# Patient Record
Sex: MALE | Race: WHITE | NOT HISPANIC OR LATINO | Employment: UNEMPLOYED | URBAN - METROPOLITAN AREA
[De-identification: names, ages, dates, MRNs, and addresses within clinical notes are randomized per-mention and may not be internally consistent; named-entity substitution may affect disease eponyms.]

---

## 2019-09-08 ENCOUNTER — HOSPITAL ENCOUNTER (EMERGENCY)
Facility: HOSPITAL | Age: 34
Discharge: HOME | End: 2019-09-08
Attending: EMERGENCY MEDICINE | Admitting: EMERGENCY MEDICINE
Payer: COMMERCIAL

## 2019-09-08 VITALS
HEIGHT: 73 IN | WEIGHT: 222 LBS | SYSTOLIC BLOOD PRESSURE: 121 MMHG | BODY MASS INDEX: 29.42 KG/M2 | OXYGEN SATURATION: 96 % | HEART RATE: 97 BPM | RESPIRATION RATE: 18 BRPM | DIASTOLIC BLOOD PRESSURE: 72 MMHG | TEMPERATURE: 98 F

## 2019-09-08 DIAGNOSIS — S61.411A LACERATION OF RIGHT HAND WITHOUT FOREIGN BODY, INITIAL ENCOUNTER: Primary | ICD-10-CM

## 2019-09-08 PROCEDURE — 13132 CMPLX RPR F/C/C/M/N/AX/G/H/F: CPT | Performed by: PHYSICIAN ASSISTANT

## 2019-09-08 PROCEDURE — 99282 EMERGENCY DEPT VISIT SF MDM: CPT

## 2019-09-08 PROCEDURE — 99283 EMERGENCY DEPT VISIT LOW MDM: CPT | Performed by: PHYSICIAN ASSISTANT

## 2019-09-08 RX ORDER — LIDOCAINE HYDROCHLORIDE AND EPINEPHRINE 10; 10 MG/ML; UG/ML
5 INJECTION, SOLUTION INFILTRATION; PERINEURAL ONCE
Status: COMPLETED | OUTPATIENT
Start: 2019-09-08 | End: 2019-09-08

## 2019-09-08 RX ADMIN — LIDOCAINE HYDROCHLORIDE AND EPINEPHRINE 5 ML: 10; 10 INJECTION, SOLUTION INFILTRATION; PERINEURAL at 08:12

## 2019-09-08 NOTE — DISCHARGE INSTRUCTIONS
Suture instructions: suture removal in 10 days - return to ED, any urgent care or primary care physician for suture removal   apply bacitracin, neosporin or any other triple antibiotic ointment to area 3 times a day until suture removal   return to ED for any redness, purulent drainage, increasing pain or any other worrisome or worsening symptoms  do not submerge laceration in water - if it gets wet, pat it dry immediately  use tylenol or motrin over the counter as needed for pain/swelling

## 2019-09-08 NOTE — ED PROVIDER NOTES
History  Chief Complaint   Patient presents with    Hand Laceration     Pt states "I tripped and fell and cut my hand on something " Pt has laceration to right hand  37 y o  male presents to the ED with chief complaint of right handlaceration  Onset reported as 1 hour prior to arrival  Location is reported as right hand  Quality is reported as laceration  Severity is reported as moderate  Associated symptoms:  Denies wrist, elbow or shoulder pain  Denies paralysis, paraesthesias or weakness to hand or upper extremity  Modifiers:  Local pressure applied to area has helped control bleeding  Hand dominance:  right  Context:  Patient reports he accidentally cut himself on a piece of glass 1 hour prior to arrival   The glass was from a bottle and he does not believe there is any glass in the wound  Last tetanus 10 years ago  Patient is right hand dominant  Denies other injuries  Medical summary - past visit history reviewed via EPIC demonstrates no prior visits to this ed  History provided by:  Patient   used: No        None       History reviewed  No pertinent past medical history  History reviewed  No pertinent surgical history  History reviewed  No pertinent family history  I have reviewed and agree with the history as documented  Social History     Tobacco Use    Smoking status: Never Smoker    Smokeless tobacco: Never Used   Substance Use Topics    Alcohol use: Yes     Comment: socially     Drug use: Not Currently        Review of Systems   Constitutional: Negative for activity change, appetite change, chills, diaphoresis, fatigue, fever and unexpected weight change  HENT: Negative for congestion, dental problem, drooling, ear discharge, ear pain, facial swelling, hearing loss, mouth sores, nosebleeds, postnasal drip, rhinorrhea, sinus pressure, sinus pain, sneezing, sore throat, tinnitus, trouble swallowing and voice change      Eyes: Negative for photophobia, pain, discharge, redness, itching and visual disturbance  Respiratory: Negative for cough, chest tightness, shortness of breath and wheezing  Cardiovascular: Negative for chest pain, palpitations and leg swelling  Gastrointestinal: Negative for abdominal distention, abdominal pain, constipation, diarrhea, nausea and vomiting  Endocrine: Negative for cold intolerance, heat intolerance, polydipsia, polyphagia and polyuria  Genitourinary: Negative for difficulty urinating, dysuria, flank pain, frequency, hematuria and urgency  Musculoskeletal: Negative for arthralgias, back pain, gait problem, joint swelling, myalgias, neck pain and neck stiffness  Skin: Positive for wound  Negative for color change, pallor and rash  Allergic/Immunologic: Negative for environmental allergies, food allergies and immunocompromised state  Neurological: Negative for dizziness, tremors, seizures, syncope, facial asymmetry, speech difficulty, weakness, light-headedness, numbness and headaches  Hematological: Negative for adenopathy  Does not bruise/bleed easily  Psychiatric/Behavioral: Negative for agitation, confusion and hallucinations  The patient is not nervous/anxious  All other systems reviewed and are negative  Physical Exam  Physical Exam   Constitutional: He is oriented to person, place, and time  He appears well-developed and well-nourished  No distress  /72 (BP Location: Left arm)   Pulse 97   Temp 98 °F (36 7 °C) (Oral)   Resp 18   Ht 6' 1" (1 854 m)   Wt 101 kg (222 lb)   SpO2 96%   BMI 29 29 kg/m²    HENT:   Head: Normocephalic and atraumatic  Right Ear: External ear normal    Left Ear: External ear normal    Nose: Nose normal    Mouth/Throat: Oropharynx is clear and moist  No oropharyngeal exudate  Eyes: Pupils are equal, round, and reactive to light  Conjunctivae and EOM are normal  Right eye exhibits no discharge  Left eye exhibits no discharge   No scleral icterus  Neck: Normal range of motion  Neck supple  No tracheal deviation present  No thyromegaly present  Cardiovascular: Normal rate, regular rhythm and intact distal pulses  Pulmonary/Chest: Effort normal and breath sounds normal  No stridor  No respiratory distress  He has no wheezes  He has no rales  He exhibits no tenderness  Abdominal: Soft  Bowel sounds are normal  He exhibits no distension and no mass  There is no tenderness  There is no rebound and no guarding  Musculoskeletal: Normal range of motion  He exhibits no edema, tenderness or deformity  Right hand examination:  No gross deformity  Distal neurovascular tendon intact to all fingers including the thumb  Capillary refills less than 3 seconds to all fingers  Flexor extensor tendon function fully intact all fingers  Strength is 5/5 and normal   The hand is nontender to palpation all surfaces with full range of motion throughout  Radial pulse 2/4 normal   All fingernails are intact without subungual hematoma or avulsion  Laceration is noted below  Specifically there are no sensorimotor deficits to the thumb or the radial nerve distribution   Lymphadenopathy:     He has no cervical adenopathy  Neurological: He is alert and oriented to person, place, and time  He displays normal reflexes  No cranial nerve deficit or sensory deficit  He exhibits normal muscle tone  Coordination normal    Skin: Skin is warm and dry  Capillary refill takes less than 2 seconds  No rash noted  He is not diaphoretic  No erythema  No pallor  There is a 7 cm curved laceration present to the dorsal surface of the right thenar eminence  Wound explored to depth  No foreign body present  No tendon laceration  No vascular orthopedic injury identified  Psychiatric: He has a normal mood and affect  His behavior is normal  Judgment and thought content normal    Nursing note and vitals reviewed        Vital Signs  ED Triage Vitals [09/08/19 0556]   Temperature Pulse Respirations Blood Pressure SpO2   98 °F (36 7 °C) 97 18 121/72 96 %      Temp Source Heart Rate Source Patient Position - Orthostatic VS BP Location FiO2 (%)   Oral Monitor Sitting Left arm --      Pain Score       1           Vitals:    09/08/19 0556   BP: 121/72   Pulse: 97   Patient Position - Orthostatic VS: Sitting         Visual Acuity      ED Medications  Medications   tetanus-diphtheria-acellular pertussis (BOOSTRIX) IM injection 0 5 mL (0 5 mL Intramuscular Not Given 9/8/19 0812)   lidocaine-epinephrine (XYLOCAINE/EPINEPHRINE) 1 %-1:100,000 injection 5 mL (5 mL Infiltration Given 9/8/19 9497)       Diagnostic Studies  Results Reviewed     None                 No orders to display              Procedures  Laceration repair  Date/Time: 9/8/2019 8:26 AM  Performed by: Jose Cuellar PA-C  Authorized by: Jose Cuellar PA-C   Consent: Verbal consent obtained  Risks and benefits: risks, benefits and alternatives were discussed  Consent given by: patient  Patient identity confirmed: verbally with patient  Time out: Immediately prior to procedure a "time out" was called to verify the correct patient, procedure, equipment, support staff and site/side marked as required  Body area: upper extremity  Location details: right hand  Laceration length: 7 cm  Foreign bodies: no foreign bodies  Tendon involvement: none  Nerve involvement: none  Vascular damage: no  Anesthesia: local infiltration    Anesthesia:  Local Anesthetic: lidocaine 1% with epinephrine    Sedation:  Patient sedated: no        Procedure Details:  Preparation: Patient was prepped and draped in the usual sterile fashion    Irrigation solution: saline  Irrigation method: syringe  Amount of cleaning: standard  Debridement: minimal  Degree of undermining: minimal  Skin closure: 4-0 nylon  Number of sutures: 13  Technique: running  Approximation: close  Approximation difficulty: complex  Patient tolerance: Patient tolerated the procedure well with no immediate complications  Comments:  Wound explored to depth, no FB present, no tendon, vascular or orthopedic injury identified  Wound irrigated with saline, edges were undermined and debrided of soft tissue prior to closure to improve Wound edge approximation  Running stitch was placed with good wound edge approximation  xeroform dressing with Tegaderm cover was applied after closure completed             ED Course                               MDM  Number of Diagnoses or Management Options  Laceration of right hand without foreign body, initial encounter: new and does not require workup  Diagnosis management comments: Discussed care of sutured wound with patient at bedside  Discussed return for suture removal in 10 days  Discussed do not submerge laceration and water  Discussed monitor for signs or symptoms of infection  Patient declined tetanus shot here in the emergency department  He was advised that he may develop tetanus as a result of this wound  He states he had one 10 years ago and does not wish to have another one  Advised regarding risk of tetanus and patient verbalized understanding but still declined tetanus  Discussed follow-up with primary care physician in 2-3 days and hand surgeon in 5-7 days for recheck of wound or for any complications  Discussed return to the emergency department in 10 days for suture removal   Discussed return sooner for any worsening or worrisome symptoms  Reviewed reasons to return to ed  Patient verbalized understanding of diagnosis and agreement with discharge plan of care as well as understanding of reasons to return to ed            Amount and/or Complexity of Data Reviewed  Obtain history from someone other than the patient: yes (Sig other)  Review and summarize past medical records: yes    Patient Progress  Patient progress: stable      Disposition  Final diagnoses:   Laceration of right hand without foreign body, initial encounter     Time reflects when diagnosis was documented in both MDM as applicable and the Disposition within this note     Time User Action Codes Description Comment    9/8/2019  8:30 AM Norma Friedman Add [[id number] [id number]] Laceration of right hand without foreign body, initial encounter       ED Disposition     ED Disposition Condition Date/Time Comment    Discharge Stable Sun Sep 8, 2019  8:30 AM Savanna Osorio discharge to home/self care  Follow-up Information     Follow up With Specialties Details Why 14 [facility] Emergency Department Emergency Medicine Go in 10 days For suture removal [facility] [address] ED, [address] [place], [place], [address] [place] MD [facility] Call in 2 days for further evaluation of symptoms, For wound re-check [address]       Burgess Dusty MD Orthopedic Surgery, Orthopedics Call in 1 week For wound re-check [phone]  [address] [phone]             There are no discharge medications for this patient  No discharge procedures on file      ED Provider  Electronically Signed by           Linette Remy PA-C  09/08/19 5296

## 2019-09-11 ENCOUNTER — APPOINTMENT (EMERGENCY)
Dept: RADIOLOGY | Facility: HOSPITAL | Age: 34
End: 2019-09-11
Payer: COMMERCIAL

## 2019-09-11 ENCOUNTER — HOSPITAL ENCOUNTER (EMERGENCY)
Facility: HOSPITAL | Age: 34
Discharge: HOME | End: 2019-09-11
Attending: EMERGENCY MEDICINE | Admitting: EMERGENCY MEDICINE
Payer: COMMERCIAL

## 2019-09-11 VITALS
SYSTOLIC BLOOD PRESSURE: 116 MMHG | RESPIRATION RATE: 18 BRPM | DIASTOLIC BLOOD PRESSURE: 76 MMHG | OXYGEN SATURATION: 99 % | HEART RATE: 58 BPM | TEMPERATURE: 98.5 F

## 2019-09-11 DIAGNOSIS — M79.89 SWELLING OF RIGHT HAND: Primary | ICD-10-CM

## 2019-09-11 DIAGNOSIS — Z51.89 VISIT FOR WOUND CHECK: ICD-10-CM

## 2019-09-11 PROCEDURE — 99283 EMERGENCY DEPT VISIT LOW MDM: CPT

## 2019-09-11 PROCEDURE — 99283 EMERGENCY DEPT VISIT LOW MDM: CPT | Performed by: EMERGENCY MEDICINE

## 2019-09-11 PROCEDURE — 73130 X-RAY EXAM OF HAND: CPT

## 2019-09-11 NOTE — ED PROVIDER NOTES
History  Chief Complaint   Patient presents with    Wound Check     had stitches placed on saturday here on his right wrist  states his hand is now swollen and is having pain in his finger     Patient is a 20-year-old male  Three days ago he had suture repair of the laceration to the dorsum of the left hand that occurred secondary to glass  His tetanus vaccination was updated  He underwent suture repair in the emergency room  He did not undergo x-ray  Returns to the emergency room today because the hand is swollen  There is no drainage  No redness  The wound is tender  Specifically he has a sharp pain to the ulnar aspect of the laceration  He also reports some paresthesias to the thenar aspect of the right index finger  No fever or chills  None       History reviewed  No pertinent past medical history  History reviewed  No pertinent surgical history  History reviewed  No pertinent family history  I have reviewed and agree with the history as documented  Social History     Tobacco Use    Smoking status: Never Smoker    Smokeless tobacco: Never Used   Substance Use Topics    Alcohol use: Yes     Comment: socially     Drug use: Not Currently        Review of Systems   Constitutional: Negative for chills and fever  Skin: Positive for wound  Neurological: Positive for numbness  Negative for weakness  All other systems reviewed and are negative  Physical Exam  Physical Exam   Constitutional: He is oriented to person, place, and time  He appears well-developed and well-nourished  No distress  HENT:   Head: Normocephalic and atraumatic  Eyes: Conjunctivae are normal  Right eye exhibits no discharge  Left eye exhibits no discharge  Neck: Normal range of motion  Neck supple  Pulmonary/Chest: Effort normal  No respiratory distress  Musculoskeletal: Normal range of motion  He exhibits edema and tenderness  He exhibits no deformity     There is a sutured laceration to the dorsum of the right hand  It is well approximated  There is some slight swelling to the dorsum of the hand  There is no erythema, warmth or fluctuance  No drainage  Patient has good range of motion of his fingers  There is some numbness to the thenar aspect of the right index finger  Neurological: He is alert and oriented to person, place, and time  Skin: Skin is warm and dry  Psychiatric: He has a normal mood and affect  His behavior is normal    Vitals reviewed  Vital Signs  ED Triage Vitals   Temperature Pulse Respirations Blood Pressure SpO2   09/11/19 1153 09/11/19 1153 09/11/19 1153 09/11/19 1154 09/11/19 1153   98 5 °F (36 9 °C) 58 18 116/76 99 %      Temp Source Heart Rate Source Patient Position - Orthostatic VS BP Location FiO2 (%)   09/11/19 1153 -- 09/11/19 1154 09/11/19 1154 --   Oral  Sitting Left arm       Pain Score       --                  Vitals:    09/11/19 1153 09/11/19 1154   BP:  116/76   Pulse: 58    Patient Position - Orthostatic VS:  Sitting         Visual Acuity      ED Medications  Medications - No data to display    Diagnostic Studies  Results Reviewed     None                 XR hand 3+ views RIGHT   ED Interpretation by Sylvie Padilla MD (09/11 3608)   No foreign body  No fracture  Procedures  Procedures       ED Course                               MDM  Number of Diagnoses or Management Options  Diagnosis management comments: No foreign body on x-ray  No infection at this time  Recommended elevation and ice  Follow up with hand specialist for evaluation of numbness  Suture removal in 7 to 11 days  Return if any redness, warmth, fever or drainage         Amount and/or Complexity of Data Reviewed  Tests in the radiology section of CPT®: ordered and reviewed  Independent visualization of images, tracings, or specimens: yes        Disposition  Final diagnoses:   Swelling of right hand   Visit for wound check     Time reflects when diagnosis was documented in both MDM as applicable and the Disposition within this note     Time User Action Codes Description Comment    9/11/2019  2:51 PM Con Boys Add [M79 89] Swelling of right hand     9/11/2019  2:51 PM Con Boys Add [Z51 89] Visit for wound check       ED Disposition     ED Disposition Condition Date/Time Comment    Discharge Stable Wed Sep 11, 2019  2:51 PM Cheri1 Mable Garcia discharge to home/self care  Follow-up Information     Follow up With Specialties Details Why Contact Info    Annemarie Young MD Orthopedic Surgery, Orthopedics In 1 week  [address] [address]  [phone]            Patient's Medications    No medications on file     No discharge procedures on file      ED Provider  Electronically Signed by           Gold Juan MD  09/11/19 4814

## 2019-09-16 NOTE — TELEPHONE ENCOUNTER
Patient is calling stating that the insurance gave him a one time referral issue number for his visit on Wednesday  Issue number [id number]

## 2019-09-18 ENCOUNTER — OFFICE VISIT (OUTPATIENT)
Dept: OBGYN CLINIC | Facility: CLINIC | Age: 34
End: 2019-09-18
Payer: COMMERCIAL

## 2019-09-18 VITALS
HEART RATE: 63 BPM | WEIGHT: 228.4 LBS | DIASTOLIC BLOOD PRESSURE: 71 MMHG | HEIGHT: 73 IN | BODY MASS INDEX: 30.27 KG/M2 | SYSTOLIC BLOOD PRESSURE: 107 MMHG

## 2019-09-18 DIAGNOSIS — S61.411A LACERATION OF RIGHT HAND WITHOUT FOREIGN BODY, INITIAL ENCOUNTER: Primary | ICD-10-CM

## 2019-09-18 PROCEDURE — 99203 OFFICE O/P NEW LOW 30 MIN: CPT | Performed by: ORTHOPAEDIC SURGERY

## 2019-09-18 NOTE — PROGRESS NOTES
CHIEF COMPLAINT:  Chief Complaint   Patient presents with    Right Hand - Pain       SUBJECTIVE:  Duncan Schuler is a 37y o  year old RHD male who presents to the office for evaluation of his right hand  Pt states that on 9/8/19 he tripped and fell and cut his hand elizabeth piece of glass  Pt presented to the ED where tetenus shot was provided and laceration repair was performed  Pt was seen in the ED again on 9/11/19 due to swelling in his right hand and pain in his fingers  Xray was taken at this time showing no fracture or dislocation or radiographic foreign bodies  PAST MEDICAL HISTORY:  History reviewed  No pertinent past medical history  PAST SURGICAL HISTORY:  History reviewed  No pertinent surgical history  FAMILY HISTORY:  History reviewed  No pertinent family history  SOCIAL HISTORY:  Social History     Tobacco Use    Smoking status: Never Smoker    Smokeless tobacco: Never Used   Substance Use Topics    Alcohol use: Yes     Comment: socially     Drug use: Not Currently       MEDICATIONS:  No current outpatient medications on file  ALLERGIES:  No Known Allergies    REVIEW OF SYSTEMS:  Review of Systems   Constitutional: Negative for chills, fever and unexpected weight change  HENT: Negative for hearing loss, nosebleeds and sore throat  Eyes: Negative for pain, redness and visual disturbance  Respiratory: Negative for cough, shortness of breath and wheezing  Cardiovascular: Negative for chest pain, palpitations and leg swelling  Gastrointestinal: Negative for abdominal pain, nausea and vomiting  Endocrine: Negative for polydipsia and polyuria  Genitourinary: Negative for dysuria and hematuria  Skin: Negative for rash and wound  Neurological: Negative for dizziness, light-headedness and headaches  Psychiatric/Behavioral: Negative for decreased concentration, dysphoric mood and suicidal ideas  The patient is not nervous/anxious          VITALS:  Vitals:    09/18/19 4842 BP: 107/71   Pulse: 63       LABS:  HgA1c: No results found for: HGBA1C  BMP: No results found for: GLUCOSE, CALCIUM, NA, K, CO2, CL, BUN, CREATININE    _____________________________________________________  PHYSICAL EXAMINATION:  General: well developed and well nourished, alert, oriented times 3 and appears comfortable  Psychiatric: Normal  HEENT: Trachea Midline, No torticollis  Pulmonary: No audible wheezing or respiratory distress   Skin: No masses, erythema, lacerations, fluctation, ulcerations  Neurovascular: Sensation Intact to the Median, Ulnar, Radial Nerve, Motor Intact to the Median, Ulnar, Radial Nerve and Pulses Intact    MUSCULOSKELETAL EXAMINATION:  Right hand   7 cm curved laceration present to the dorsal surface of the right thump proximal to the MP joint  No drainage   No ecchymosis  Decreased sensation on the dorsal radial aspect of the index finger  Extensor tendons intact     ___________________________________________________  STUDIES REVIEWED:  Images obtained on 9/11/19 of the right hand  3 views demonstrate no fracture, dislocation or rediographic foreign bodies      PROCEDURES PERFORMED:  Suture removal  Date/Time: 9/18/2019 10:19 AM  Performed by: Afia Ann MD  Authorized by: Afia Ann MD     Patient location:  Clinic  Consent:     Consent obtained:  Verbal    Consent given by:  Patient  Universal protocol:     Procedure explained and questions answered to patient or proxy's satisfaction: yes      Patient identity confirmed:  Verbally with patient  Location:     Laterality:  Right    Location:  Upper extremity    Upper extremity location:  Hand    Hand location:  R hand  Procedure details: Tools used:  Suture removal kit    Wound appearance:  No sign(s) of infection  Post-procedure details:     Post-removal:  Steri-Strips applied    Patient tolerance of procedure:   Tolerated well, no immediate complications          _____________________________________________________  ASSESSMENT/PLAN:    Right hand laceration sustained 9/8/19  * sutures were removed and steri-strips were applied without complications  * Pt was advised that he may wash normally with soap and water   * Pt was advised to avoid dirty water for a few days  * Pt was advised that the decreased sensation on the dorsal radial aspect of the index finger will resolve over time   *Pt was advised to call the office if he has any questions or concerns        Follow Up:  Return if symptoms worsen or fail to improve      Work/school status:  Pt declined work note     To Do Next Visit:  Re-evaluation of current issue        Scribe Attestation    I,:   Lisette Larkin am acting as a scribe while in the presence of the attending physician :        I,:   Keshav Mortensen MD personally performed the services described in this documentation    as scribed in my presence :

## 2019-12-09 ENCOUNTER — HOSPITAL ENCOUNTER (EMERGENCY)
Facility: HOSPITAL | Age: 34
Discharge: HOME | End: 2019-12-09
Attending: EMERGENCY MEDICINE
Payer: COMMERCIAL

## 2019-12-09 VITALS
HEIGHT: 73 IN | DIASTOLIC BLOOD PRESSURE: 76 MMHG | RESPIRATION RATE: 16 BRPM | OXYGEN SATURATION: 97 % | WEIGHT: 230 LBS | HEART RATE: 72 BPM | TEMPERATURE: 98.7 F | BODY MASS INDEX: 30.48 KG/M2 | SYSTOLIC BLOOD PRESSURE: 127 MMHG

## 2019-12-09 DIAGNOSIS — K42.9 UMBILICAL HERNIA: Primary | ICD-10-CM

## 2019-12-09 PROCEDURE — 99284 EMERGENCY DEPT VISIT MOD MDM: CPT | Performed by: PHYSICIAN ASSISTANT

## 2019-12-09 PROCEDURE — 99283 EMERGENCY DEPT VISIT LOW MDM: CPT

## 2019-12-09 RX ORDER — ONDANSETRON 4 MG/1
4 TABLET, ORALLY DISINTEGRATING ORAL EVERY 8 HOURS PRN
Qty: 15 TABLET | Refills: 0 | Status: SHIPPED | OUTPATIENT
Start: 2019-12-09

## 2019-12-09 NOTE — ED PROVIDER NOTES
History  Chief Complaint   Patient presents with    Hernia     Patient states"he thinks he has a hernia and is having abdominal pain and nausea"     HPI    None       History reviewed  No pertinent past medical history  Past Surgical History:   Procedure Laterality Date    SHOULDER SURGERY         History reviewed  No pertinent family history  I have reviewed and agree with the history as documented  Social History     Tobacco Use    Smoking status: Never Smoker    Smokeless tobacco: Never Used   Substance Use Topics    Alcohol use: Yes     Comment: socially     Drug use: Not Currently        Review of Systems    Physical Exam  Physical Exam    Vital Signs  ED Triage Vitals [12/09/19 1523]   Temperature Pulse Respirations Blood Pressure SpO2   98 7 °F (37 1 °C) 70 16 120/72 98 %      Temp Source Heart Rate Source Patient Position - Orthostatic VS BP Location FiO2 (%)   Oral Monitor Sitting Left arm --      Pain Score       --           Vitals:    12/09/19 1523   BP: 120/72   Pulse: 70   Patient Position - Orthostatic VS: Sitting         Visual Acuity      ED Medications  Medications - No data to display    Diagnostic Studies  Results Reviewed     None                 No orders to display              Procedures  Procedures         ED Course                               MDM      Disposition  Final diagnoses:   None     ED Disposition     None      Follow-up Information    None         Patient's Medications    No medications on file     No discharge procedures on file      ED Provider  Electronically Signed by

## 2019-12-09 NOTE — ED PROVIDER NOTES
History  Chief Complaint   Patient presents with    Hernia     Patient states"he thinks he has a hernia and is having abdominal pain and nausea"     64-year-old male patient here for what he believes is a hernia  States for 1 week he noticed a bulge in his umbilicus and states any time he bends over gets worse  He has pain there when he bends over otherwise at rest and currently has no pain  He was looking online is concerned for a hernia  No fevers or chills  Also noted was or twice to become nauseous  No vomiting  No prior abdominal surgeries  Otherwise healthy and up-to-date on vaccinations  History provided by:  Patient   used: No    Abdominal Pain   Pain location:  Periumbilical  Pain quality: aching    Pain radiates to:  Does not radiate  Pain severity:  Mild  Onset quality:  Gradual  Duration:  1 week  Timing:  Intermittent  Progression:  Unchanged  Chronicity:  New  Context: not alcohol use, not awakening from sleep, not diet changes, not eating, not laxative use, not medication withdrawal, not previous surgeries, not recent illness, not recent sexual activity, not recent travel, not retching, not sick contacts, not suspicious food intake and not trauma    Relieved by:  Nothing  Worsened by:  Nothing  Ineffective treatments:  None tried  Associated symptoms: no chest pain, no chills, no constipation, no cough, no diarrhea, no dysuria, no fatigue, no fever, no hematuria, no nausea, no shortness of breath, no sore throat and no vomiting        None       History reviewed  No pertinent past medical history  Past Surgical History:   Procedure Laterality Date    SHOULDER SURGERY         History reviewed  No pertinent family history  I have reviewed and agree with the history as documented      Social History     Tobacco Use    Smoking status: Never Smoker    Smokeless tobacco: Never Used   Substance Use Topics    Alcohol use: Yes     Comment: socially     Drug use: Not Currently        Review of Systems   Constitutional: Negative for activity change, appetite change, chills, diaphoresis, fatigue, fever and unexpected weight change  HENT: Negative for congestion, rhinorrhea, sinus pressure, sore throat and trouble swallowing  Eyes: Negative for photophobia and visual disturbance  Respiratory: Negative for apnea, cough, choking, chest tightness, shortness of breath, wheezing and stridor  Cardiovascular: Negative for chest pain, palpitations and leg swelling  Gastrointestinal: Positive for abdominal pain  Negative for abdominal distention, blood in stool, constipation, diarrhea, nausea and vomiting  Genitourinary: Negative for decreased urine volume, difficulty urinating, dysuria, enuresis, flank pain, frequency, hematuria and urgency  Musculoskeletal: Negative for arthralgias, myalgias, neck pain and neck stiffness  Skin: Negative for color change, pallor, rash and wound  Allergic/Immunologic: Negative  Neurological: Negative for dizziness, tremors, syncope, weakness, light-headedness, numbness and headaches  Hematological: Negative  Psychiatric/Behavioral: Negative  All other systems reviewed and are negative  Physical Exam  Physical Exam   Constitutional: He is oriented to person, place, and time  He appears well-developed and well-nourished  Non-toxic appearance  He does not have a sickly appearance  He does not appear ill  No distress  HENT:   Head: Normocephalic and atraumatic  Eyes: Pupils are equal, round, and reactive to light  EOM and lids are normal    Neck: Normal range of motion  Neck supple  Cardiovascular: Normal rate, regular rhythm, S1 normal, S2 normal, normal heart sounds, intact distal pulses and normal pulses  Exam reveals no gallop, no distant heart sounds, no friction rub and no decreased pulses  No murmur heard  Pulses:       Radial pulses are 2+ on the right side, and 2+ on the left side     Pulmonary/Chest: Effort normal and breath sounds normal  No accessory muscle usage  No apnea, no tachypnea and no bradypnea  No respiratory distress  He has no decreased breath sounds  He has no wheezes  He has no rhonchi  He has no rales  Abdominal: Soft  Normal appearance  He exhibits no distension  There is tenderness in the periumbilical area  There is no rigidity, no rebound and no guarding  A hernia (umbilical, easily reduced/sliding) is present  Musculoskeletal: Normal range of motion  He exhibits no edema, tenderness or deformity  Neurological: He is alert and oriented to person, place, and time  No cranial nerve deficit  GCS eye subscore is 4  GCS verbal subscore is 5  GCS motor subscore is 6  Skin: Skin is warm, dry and intact  No rash noted  He is not diaphoretic  No erythema  No pallor  Psychiatric: His speech is normal    Nursing note and vitals reviewed  Vital Signs  ED Triage Vitals   Temperature Pulse Respirations Blood Pressure SpO2   12/09/19 1523 12/09/19 1523 12/09/19 1523 12/09/19 1523 12/09/19 1523   98 7 °F (37 1 °C) 70 16 120/72 98 %      Temp Source Heart Rate Source Patient Position - Orthostatic VS BP Location FiO2 (%)   12/09/19 1523 12/09/19 1523 12/09/19 1523 12/09/19 1523 --   Oral Monitor Sitting Left arm       Pain Score       12/09/19 1719       1           Vitals:    12/09/19 1523 12/09/19 1719   BP: 120/72 127/76   Pulse: 70 72   Patient Position - Orthostatic VS: Sitting          Visual Acuity      ED Medications  Medications - No data to display    Diagnostic Studies  Results Reviewed     None                 No orders to display              Procedures  Procedures         ED Course                               MDM  Number of Diagnoses or Management Options  Umbilical hernia: new and requires workup  Diagnosis management comments: Differential diagnosis includes but is not limited to sliding hernia, doubt acute surgical process, doubt bowel obstruction, doubt ileus       Risk of Complications, Morbidity, and/or Mortality  Presenting problems: moderate  Management options: low  General comments: Plan/MDM:  12-year-old male patient here with pain at his umbilicus  He had a small easily reduced umbilical hernia  No exam findings to suggest strangulated hernia  He has minimal to no pain  Recommended close follow-up with outpatient general surgery for continued evaluation  Discussed earlier return parameters  Patient understands and agrees with plan  Patient Progress  Patient progress: stable        Disposition  Final diagnoses:   Umbilical hernia - sliding     Time reflects when diagnosis was documented in both MDM as applicable and the Disposition within this note     Time User Action Codes Description Comment    12/9/2019  5:41 PM [place] Betyas Add [Q01 9] Umbilical hernia     77/9/7609  5:41 PM [place] Golas Modify [B55 6] Umbilical hernia sliding      ED Disposition     ED Disposition Condition Date/Time Comment    Discharge Stable Mon Dec 9, 2019  5:41 PM [address] Garcia discharge to home/self care  Follow-up Information     Follow up With Specialties Details Why Contact Info Additional Information    [address] [facility] General Surgery Call  for surgical evaluation of your umbilical hernia [address]  [place] [address] [facility],[address] [id number]  [facility], [address] [facility]  [address] [place], [place], [facility] [address]          Discharge Medication List as of 12/9/2019  5:42 PM      START taking these medications    Details   ondansetron (ZOFRAN-ODT) 4 mg disintegrating tablet Take 1 tablet (4 mg total) by mouth every 8 (eight) hours as needed for nausea, Starting Mon 12/9/2019, Print           No discharge procedures on file      ED Provider  Electronically Signed by           Gladys Pedro PA-C  12/09/19 5859

## 2020-02-04 ENCOUNTER — APPOINTMENT (EMERGENCY)
Dept: RADIOLOGY | Facility: HOSPITAL | Age: 35
End: 2020-02-04
Payer: COMMERCIAL

## 2020-02-04 ENCOUNTER — HOSPITAL ENCOUNTER (EMERGENCY)
Facility: HOSPITAL | Age: 35
Discharge: HOME | End: 2020-02-04
Attending: EMERGENCY MEDICINE | Admitting: EMERGENCY MEDICINE
Payer: COMMERCIAL

## 2020-02-04 VITALS
WEIGHT: 220.02 LBS | BODY MASS INDEX: 29.16 KG/M2 | DIASTOLIC BLOOD PRESSURE: 77 MMHG | SYSTOLIC BLOOD PRESSURE: 125 MMHG | RESPIRATION RATE: 18 BRPM | TEMPERATURE: 98.5 F | OXYGEN SATURATION: 96 % | HEIGHT: 73 IN | HEART RATE: 89 BPM

## 2020-02-04 DIAGNOSIS — J18.9 COMMUNITY ACQUIRED PNEUMONIA: Primary | ICD-10-CM

## 2020-02-04 LAB
FLUAV RNA NPH QL NAA+PROBE: NORMAL
FLUBV RNA NPH QL NAA+PROBE: NORMAL
RSV RNA NPH QL NAA+PROBE: NORMAL
S PYO DNA THROAT QL NAA+PROBE: NORMAL

## 2020-02-04 PROCEDURE — 99283 EMERGENCY DEPT VISIT LOW MDM: CPT | Performed by: PHYSICIAN ASSISTANT

## 2020-02-04 PROCEDURE — 99283 EMERGENCY DEPT VISIT LOW MDM: CPT

## 2020-02-04 PROCEDURE — 87631 RESP VIRUS 3-5 TARGETS: CPT | Performed by: PHYSICIAN ASSISTANT

## 2020-02-04 PROCEDURE — 71046 X-RAY EXAM CHEST 2 VIEWS: CPT

## 2020-02-04 PROCEDURE — 87651 STREP A DNA AMP PROBE: CPT | Performed by: PHYSICIAN ASSISTANT

## 2020-02-04 RX ORDER — DOXYCYCLINE HYCLATE 100 MG/1
100 CAPSULE ORAL 2 TIMES DAILY
Qty: 19 CAPSULE | Refills: 0 | Status: SHIPPED | OUTPATIENT
Start: 2020-02-04 | End: 2020-02-14

## 2020-02-04 RX ORDER — DOXYCYCLINE HYCLATE 100 MG/1
100 CAPSULE ORAL ONCE
Status: COMPLETED | OUTPATIENT
Start: 2020-02-04 | End: 2020-02-04

## 2020-02-04 RX ADMIN — DOXYCYCLINE 100 MG: 100 CAPSULE ORAL at 17:08

## 2020-02-04 NOTE — DISCHARGE INSTRUCTIONS
Take antibiotic as prescribed  Drink plenty of fluids and rest  Take Tylenol as needed for fevers  Follow-up with your family doctor  You will need a repeat chest x-ray in 6-8 weeks  Return to ER with worsening symptoms and shortness of breath

## 2020-02-04 NOTE — ED PROVIDER NOTES
History  Chief Complaint   Patient presents with    Flu Symptoms     pt with flu like symptoms and fever     44yo male who is otherwise healthy presenting for evaluation of flu-like symptoms x 3 days  Symptoms include myalgias, fatigue, anorexia, cough, and sore throat  Patient reports subjective fevers at home but his thermometer broke so he was unable to take his temperature  He has been using Tylenol with some relief  Last dose of Tylenol was last night  No known sick contacts  Patient's wife is worried that he has walking pneumonia  Patient denies chest pain, shortness of breath, vomiting, diarrhea, abdominal pain, ear pain  Patient did not receive the flu shot this season  History provided by:  Patient   used: No    Flu Symptoms   Presenting symptoms: cough, fatigue, fever, myalgias, rhinorrhea and sore throat    Presenting symptoms: no diarrhea, no headaches, no nausea, no shortness of breath and no vomiting    Severity:  Moderate  Onset quality:  Sudden  Duration:  3 days  Progression:  Worsening  Chronicity:  New  Relieved by:  Nothing  Worsened by:  Nothing  Ineffective treatments:  None tried  Associated symptoms: nasal congestion    Associated symptoms: no chills, no decreased appetite, no decrease in physical activity, no ear pain, no mental status change, no neck stiffness and no syncope    Risk factors: no immunocompromised state and no sick contacts        Prior to Admission Medications   Prescriptions Last Dose Informant Patient Reported? Taking?   ondansetron (ZOFRAN-ODT) 4 mg disintegrating tablet   No No   Sig: Take 1 tablet (4 mg total) by mouth every 8 (eight) hours as needed for nausea      Facility-Administered Medications: None       History reviewed  No pertinent past medical history  Past Surgical History:   Procedure Laterality Date    SHOULDER SURGERY         History reviewed  No pertinent family history    I have reviewed and agree with the history as documented  Social History     Tobacco Use    Smoking status: Never Smoker    Smokeless tobacco: Never Used   Substance Use Topics    Alcohol use: Yes     Comment: socially     Drug use: Not Currently        Review of Systems   Constitutional: Positive for fatigue and fever  Negative for appetite change, chills and decreased appetite  HENT: Positive for congestion, rhinorrhea and sore throat  Negative for drooling, ear pain, trouble swallowing and voice change  Eyes: Negative for discharge and redness  Respiratory: Positive for cough  Negative for shortness of breath, wheezing and stridor  Cardiovascular: Negative for chest pain and palpitations  Gastrointestinal: Negative for abdominal pain, diarrhea, nausea and vomiting  Genitourinary: Negative for difficulty urinating and dysuria  Musculoskeletal: Positive for myalgias  Negative for neck pain and neck stiffness  Skin: Negative for color change and rash  Allergic/Immunologic: Negative for immunocompromised state  Neurological: Negative for syncope, weakness and headaches  Psychiatric/Behavioral: Negative for confusion  All other systems reviewed and are negative  Physical Exam  Physical Exam   Constitutional: He appears well-developed and well-nourished  No distress  Nontoxic appearing    HENT:   Head: Normocephalic and atraumatic  Right Ear: Tympanic membrane and external ear normal    Left Ear: Tympanic membrane and external ear normal    Nose: Mucosal edema and rhinorrhea present  Mouth/Throat: Oropharynx is clear and moist    Eyes: Conjunctivae are normal  Right eye exhibits no discharge  Left eye exhibits no discharge  No scleral icterus  Neck: Normal range of motion  Neck supple  No meningismus    Cardiovascular: Normal rate, regular rhythm and normal heart sounds  No murmur heard  Pulmonary/Chest: Effort normal and breath sounds normal  No stridor  No respiratory distress  He has no wheezes   He has no rales    Lungs clear  No increased work of breathing  Speaking in full sentences without difficulty  Oxygen saturation 96% on room air  Abdominal: Soft  Bowel sounds are normal  He exhibits no distension  There is no tenderness  There is no guarding  Umbilical hernia present  No pain elicited on palpation    Musculoskeletal: Normal range of motion  He exhibits no deformity  Neurological: He is alert  He is not disoriented  GCS eye subscore is 4  GCS verbal subscore is 5  GCS motor subscore is 6  Skin: Skin is warm and dry  He is not diaphoretic  Psychiatric: He has a normal mood and affect  His behavior is normal    Nursing note and vitals reviewed  Vital Signs  ED Triage Vitals [02/04/20 1419]   Temperature Pulse Respirations Blood Pressure SpO2   98 5 °F (36 9 °C) 89 18 125/77 96 %      Temp Source Heart Rate Source Patient Position - Orthostatic VS BP Location FiO2 (%)   Oral Monitor Sitting Left arm --      Pain Score       No Pain           Vitals:    02/04/20 1419   BP: 125/77   Pulse: 89   Patient Position - Orthostatic VS: Sitting         Visual Acuity      ED Medications  Medications   doxycycline hyclate (VIBRAMYCIN) capsule 100 mg (100 mg Oral Given 2/4/20 1708)       Diagnostic Studies  Results Reviewed     Procedure Component Value Units Date/Time    Strep A PCR [[id number]]  (Normal) Collected:  02/04/20 1526    Lab Status:  Final result Specimen:  Throat Updated:  02/04/20 1713     STREP A PCR None Detected    Influenza A/B and RSV PCR [[id number]]  (Normal) Collected:  02/04/20 1526    Lab Status:  Final result Specimen:  Nose Updated:  02/04/20 1646     INFLUENZA A PCR None Detected     INFLUENZA B PCR None Detected     RSV PCR None Detected                 XR chest 2 views   Final Result by Rosalia Freedman MD (02/04 1557)      Subsegmental atelectasis or early infiltrate lateral left lung base    Continued follow-up to confirm resolution            Workstation performed: [id number] Procedures  Procedures         ED Course                 MDM  Number of Diagnoses or Management Options  Community acquired pneumonia: new and requires workup  Diagnosis management comments: 42yo male presenting for evaluation of flu-like symptoms and cough x 3 days  He reports subjective fevers at home  No shortness of breath or chest pain  Vital signs normal  Exam is overall unremarkable and he appears non-toxic  No meningismus  Abdomen is soft and non-tender  Differential diagnosis includes but is not limited to: influenza, URI, sinusitis, AOM, bronchitis, pneumonia    Initial ED plan: Check influenza and strep PCR  CXR to evaluate for infiltrate  Final assessment: Influenza and strep are negative  CXR reveals questionable early infiltrate vs atelectasis  Given symptoms, will cover with antibiotics  Script given for doxycycline  Further workup is not indicated at this time  Instructed patient on the need for repeat CXR in 6-8 weeks  PCP referral given for follow-up  ED return precautions discussed including shortness of breath and worsening symptoms despite PO antibiotics  Patient expressed understanding and is agreeable to plan  Patient discharged in stable condition           Amount and/or Complexity of Data Reviewed  Clinical lab tests: ordered and reviewed  Tests in the radiology section of CPT®: ordered and reviewed  Review and summarize past medical records: yes  Independent visualization of images, tracings, or specimens: yes    Risk of Complications, Morbidity, and/or Mortality  Presenting problems: moderate  Diagnostic procedures: low  Management options: low    Patient Progress  Patient progress: stable        Disposition  Final diagnoses:   Community acquired pneumonia     Time reflects when diagnosis was documented in both MDM as applicable and the Disposition within this note     Time User Action Codes Description Comment    2/4/2020  5:00 PM [address] [place] [address] acquired pneumonia       ED Disposition     ED Disposition Condition Date/Time Comment    Discharge Stable Tue Feb 4, 2020  5:00 PM 1943 [address] Garcia discharge to home/self care  Follow-up Information     Follow up With Specialties Details Why Contact Info Additional Information    Peter Fitzgerald, [address], Nurse Practitioner  Call to schedule a follow-up appointment [address] [place], [place] Jamee Miranda [address] [facility] 130 Emergency Department Emergency Medicine  If symptoms worsen [address] [facility] [id number]  [address] [place] [address] ED, [address] [place] [address], [facility]          Discharge Medication List as of 2/4/2020  5:02 PM      START taking these medications    Details   doxycycline hyclate (VIBRAMYCIN) 100 mg capsule Take 1 capsule (100 mg total) by mouth 2 (two) times a day for 10 days Your first dose was given in the emergency department  , Starting Tue 2/4/2020, Until Fri 2/14/2020, Normal         CONTINUE these medications which have NOT CHANGED    Details   ondansetron (ZOFRAN-ODT) 4 mg disintegrating tablet Take 1 tablet (4 mg total) by mouth every 8 (eight) hours as needed for nausea, Starting Mon 12/9/2019, Print           No discharge procedures on file      ED Provider  Electronically Signed by           Yarely Parham PA-C  02/05/20 2080

## 2025-03-25 ENCOUNTER — APPOINTMENT (EMERGENCY)
Dept: RADIOLOGY | Facility: HOSPITAL | Age: 40
End: 2025-03-25

## 2025-03-25 ENCOUNTER — HOSPITAL ENCOUNTER (EMERGENCY)
Facility: HOSPITAL | Age: 40
Discharge: HOME | End: 2025-03-25
Attending: EMERGENCY MEDICINE

## 2025-03-25 VITALS
RESPIRATION RATE: 20 BRPM | DIASTOLIC BLOOD PRESSURE: 81 MMHG | TEMPERATURE: 98.2 F | OXYGEN SATURATION: 96 % | SYSTOLIC BLOOD PRESSURE: 130 MMHG | HEIGHT: 73 IN | WEIGHT: 230.6 LBS | BODY MASS INDEX: 30.56 KG/M2 | HEART RATE: 77 BPM

## 2025-03-25 DIAGNOSIS — M79.672 LEFT FOOT PAIN: Primary | ICD-10-CM

## 2025-03-25 PROCEDURE — 99284 EMERGENCY DEPT VISIT MOD MDM: CPT | Performed by: EMERGENCY MEDICINE

## 2025-03-25 PROCEDURE — 73630 X-RAY EXAM OF FOOT: CPT

## 2025-03-25 PROCEDURE — 99283 EMERGENCY DEPT VISIT LOW MDM: CPT

## 2025-03-26 NOTE — DISCHARGE INSTRUCTIONS
Patient Education     Bunion Discharge Instructions   About this topic   A bunion is a common problem that happens in the foot. A bunion happens when your big toe points towards the other toes. It causes a hard, [place] bump near the outside of your big toe. This makes the joint unstable and can lead to pain, especially when wearing shoes and when walking. There is a bursa or a small, fluid-filled sac that is in this area. It acts as a cushion between your bone and tendon. The bursa over the bump can get swollen and hurt.  What care is needed at home?   Ask your doctor what you need to do when you go home. Make sure you ask questions if you do not understand what the doctor says. This way you will know what you need to do.  Follow your doctor's advice about:  Comfortable shoes. Change shoe types to one with a wide toe. The shoe should also have no heel or a very low heel.  Wear shoes 1/2 inch (1.25 cm) longer than your longest toe.  Inserts for your shoes. These are foot orthotics.  Toe straighteners, splints, cushions, or pads  Use padding over the painful part or tape your foot.  Place an ice pack or a bag of frozen peas wrapped in a towel over the painful part. Never put ice right on the skin. Do not leave the ice on more than 10 to 15 minutes at a time.  Prop your foot on pillows to help with swelling.  What follow-up care is needed?   Your doctor may ask you to make visits to the office to check on your progress. Be sure to keep these visits. You doctor may have you go see a specialist. You may need to see a foot doctor called a podiatrist.  What drugs may be needed?   The doctor may order drugs to:  Help with pain and swelling  The doctor may give you a shot of an anti-inflammatory drug called a corticosteroid. This will help with swelling. Talk with your doctor about the risks of this shot.  Will physical activity be limited?   You may need to rest your foot for a while. You should not do physical activity that  makes your health problem worse. If you run, work out, or play sports, you may not be able to do those things until your health problem gets better.   What problems could happen?   More deformity in the foot  Trouble walking  Problems with balance  Need for surgery  Stiffness  Ongoing pain  What can be done to prevent this health problem?   Wear comfortable, supportive shoes. Avoid wearing high heels and tight shoes.  Avoid activities that cause foot pain, such as standing for long periods of time.  Keep a healthy weight. Being overweight may put extra stress on your feet.  When do I need to call the doctor?   Pain or swelling gets worse, or pain that stops you from doing normal activity  Health problem is not better or you are feeling worse  Helpful tips   Seek treatment early when the first signs of a bunion show up. Earlier treatment leads to better outcomes.  Teach Back: Helping You Understand   The Teach Back Method helps you understand the information we are giving you. After talking with the staff, tell them in your own words what you learned. This helps to make sure the staff has described each thing clearly. It also helps to explain things that may have been confusing. Before going home, make sure you can do these:  I can tell you about my condition.  I can tell you what may help ease my pain.  I can tell you what I will do if I have more pain or swelling.  Last Reviewed Date   2020-10-28  Consumer Information Use and Disclaimer   This generalized information is a limited summary of diagnosis, treatment, and/or medication information. It is not meant to be comprehensive and should be used as a tool to help the user understand and/or assess potential diagnostic and treatment options. It does NOT include all information about conditions, treatments, medications, side effects, or risks that may apply to a specific patient. It is not intended to be medical advice or a substitute for the medical advice,  diagnosis, or treatment of a health care provider based on the health care provider's examination and assessment of a patient’s specific and unique circumstances. Patients must speak with a health care provider for complete information about their health, medical questions, and treatment options, including any risks or benefits regarding use of medications. This information does not endorse any treatments or medications as safe, effective, or approved for treating a specific patient. [employer] Inc[employer] and its affiliates disclaim any warranty or liability relating to this information or the use thereof. The use of this information is governed by the Terms of Use, available at https://www.[employer].com/en/know/clinical-effectiveness-terms   Copyright   Copyright © 2024 [employer], [employer] and its affiliates and/or licensors. All rights reserved.

## 2025-03-26 NOTE — ED PROVIDER NOTES
Time reflects when diagnosis was documented in both MDM as applicable and the Disposition within this note       Time User Action Codes Description Comment    3/25/2025 10:23 PM SchwartzAnirudh Add [M79.672] Left foot pain           ED Disposition       ED Disposition   Discharge    Condition   Stable    Date/Time   Tue Mar 25, 2025 10:23 PM    Comment   Ender Villela discharge to home/self care.                   Assessment & Plan       Medical Decision Making  39-year-old male, presenting with pain in the left foot.  Differential diagnosis includes hallux valgus, sprain, gout among other diagnoses.  X-ray ordered.    Patient symptoms likely related to hallux valgus, clinically does not appear as gout.  Instructed to use anti-inflammatory medication, ice, elevation.  Follow-up with podiatry for further evaluation and treatment.    Amount and/or Complexity of Data Reviewed  Radiology: ordered and independent interpretation performed. Decision-making details documented in ED Course.        ED Course as of 03/25/25 2227   Tue Mar 25, 2025   2222 X-ray left foot independently reviewed myself, no acute abnormality.       Medications - No data to display    ED Risk Strat Scores                            SBIRT 22yo+      Flowsheet Row Most Recent Value   Initial Alcohol Screen: US AUDIT-C     1. How often do you have a drink containing alcohol? 1 Filed at: 03/25/2025 2115   2. How many drinks containing alcohol do you have on a typical day you are drinking?  0 Filed at: 03/25/2025 2115   3a. Male UNDER 65: How often do you have five or more drinks on one occasion? 0 Filed at: 03/25/2025 2115   Audit-C Score 1 Filed at: 03/25/2025 2115   GABE: How many times in the past year have you...    Used an illegal drug or used a prescription medication for non-medical reasons? Never Filed at: 03/25/2025 2115                            History of Present Illness       Chief Complaint   Patient presents with    Toe Pain     Pt  presents to ER from home for reports of L foot 1st digit pain and swelling ongoing for about 24 hours. Denies injury.        History reviewed. No pertinent past medical history.   Past Surgical History:   Procedure Laterality Date    SHOULDER SURGERY        History reviewed. No pertinent family history.   Social History     Tobacco Use    Smoking status: Never    Smokeless tobacco: Never   Substance Use Topics    Alcohol use: Yes     Comment: socially     Drug use: Not Currently      E-Cigarette/Vaping      E-Cigarette/Vaping Substances      I have reviewed and agree with the history as documented.     39-year-old male, presents with left foot pain.  Patient states he has had similar pain in the past but it has been worse over the past day.  Denies any injury.  No swelling or pain proximally in the leg.      History provided by:  Patient   used: No    Toe Pain  Associated symptoms: no fever        Review of Systems   Constitutional: Negative.  Negative for fever.   Neurological: Negative.            Objective       ED Triage Vitals [03/25/25 2113]   Temperature Pulse Blood Pressure Respirations SpO2 Patient Position - Orthostatic VS   98.2 °F (36.8 °C) 77 130/81 20 96 % Sitting      Temp Source Heart Rate Source BP Location FiO2 (%) Pain Score    Temporal Monitor Left arm -- 4      Vitals      Date and Time Temp Pulse SpO2 Resp BP Pain Score FACES Pain Rating User   03/25/25 2113 98.2 °F (36.8 °C) 77 96 % 20 130/81 4 -- AM            Physical Exam  Vitals and nursing note reviewed.   Constitutional:       General: He is not in acute distress.  HENT:      Head: Normocephalic and atraumatic.   Cardiovascular:      Rate and Rhythm: Normal rate.   Pulmonary:      Effort: Pulmonary effort is normal.   Musculoskeletal:         General: Normal range of motion.      Comments: Left foot with mild swelling and tenderness to left first toe MP joint.  No ankle or lower extremity swelling or tenderness.    Skin:     General: Skin is warm and dry.   Neurological:      General: No focal deficit present.      Mental Status: He is alert and oriented to person, place, and time.         Results Reviewed       None            XR foot 3+ views LEFT   ED Interpretation by Anirudh Schwartz MD (03/25 2223)   No fracture          Procedures    ED Medication and Procedure Management   Prior to Admission Medications   Prescriptions Last Dose Informant Patient Reported? Taking?   ondansetron (ZOFRAN-ODT) 4 mg disintegrating tablet   No No   Sig: Take 1 tablet (4 mg total) by mouth every 8 (eight) hours as needed for nausea      Facility-Administered Medications: None     Patient's Medications   Discharge Prescriptions    No medications on file     No discharge procedures on file.  ED SEPSIS DOCUMENTATION   Time reflects when diagnosis was documented in both MDM as applicable and the Disposition within this note       Time User Action Codes Description Comment    3/25/2025 10:23 PM Anirudh Schwartz Add [M79.672] Left foot pain                  Anirudh Schwartz MD  03/25/25 2260